# Patient Record
Sex: MALE | Race: OTHER | Employment: FULL TIME | URBAN - METROPOLITAN AREA
[De-identification: names, ages, dates, MRNs, and addresses within clinical notes are randomized per-mention and may not be internally consistent; named-entity substitution may affect disease eponyms.]

---

## 2018-05-09 ENCOUNTER — HOSPITAL ENCOUNTER (OUTPATIENT)
Dept: PHYSICAL THERAPY | Age: 38
Discharge: HOME | End: 2018-05-09
Payer: COMMERCIAL

## 2018-05-09 PROCEDURE — 97530 THERAPEUTIC ACTIVITIES: CPT | Performed by: PHYSICAL THERAPIST

## 2018-05-09 PROCEDURE — 97161 PT EVAL LOW COMPLEX 20 MIN: CPT | Performed by: PHYSICAL THERAPIST

## 2018-05-09 PROCEDURE — 97110 THERAPEUTIC EXERCISES: CPT | Performed by: PHYSICAL THERAPIST

## 2018-05-09 NOTE — PROGRESS NOTES
[facility] in [address] [facility] [place], [address] [place] [address]  Phone: [phone] Fax: [phone] of Care/ Statement of Necessity for Physical Therapy Services    Patient name: Billie Forrester Start of Care: 2018   Referral source: Thomas Arzate MD : 1980    Medical Diagnosis: Lumbar strain [S39.012A]   Onset Date:5/3/2018    Treatment Diagnosis: LBP   Prior Hospitalization: see medical history Provider#: [id number]   Medications: Verified on Patient summary List    Comorbidities: smoker 1/2 PPD    Prior Level of Function: no limitations       The Plan of Care and following information is based on the information from the initial evaluation. Assessment/ key information: pt is a pleasant 36y/o male with low back pain x 5 days intermittent but initial pain occurred went bent forward to pick something up. + intense sharp pain , difficulty WB and walking went to ER. Today pain 0-4/10 aggravated with standing flexion less with pressups and DK to Chest. No nerve tension, no deviation, no rad sx , WNL gross LE strength 5/5 but would benefit from core strength progression , WNL reflexes , WNL sensation. Mild left rotation alignment L4-5. Tight left HS 69 deg , mild tight quads Right > left. Pt started on extension mobility progression and discussed use of lumbar roll in sitting. Pt would benefit from skilled physical therapy for return to PLOF without pain and to gain HEP and education to avoid recurrence in future.    Evaluation Complexity History MEDIUM  Complexity : 1-2 comorbidities / personal factors will impact the outcome/ POC ; Examination HIGH Complexity : 4+ Standardized tests and measures addressing body structure, function, activity limitation and / or participation in recreation  ;Presentation LOW Complexity : Stable, uncomplicated  ;Clinical Decision Making LOW Complexity : FOTO score of   Overall Complexity Rating: LOW   Problem List: pain affecting function, decrease ROM, decrease strength, decrease ADL/ functional abilitiies, decrease activity tolerance and decrease flexibility/ joint mobility   Treatment Plan may include any combination of the following: Therapeutic exercise, Therapeutic activities, Neuromuscular re-education, Physical agent/modality, Manual therapy, Patient education, Self Care training and Functional mobility training  Patient / Family readiness to learn indicated by: asking questions, trying to perform skills and interest  Persons(s) to be included in education: patient (P)  Barriers to Learning/Limitations: None  Patient Goal (s): get better strengthen lower back   Patient Self Reported Health Status: excellent  Rehabilitation Potential: excellent    Short Term Goals: To be accomplished in 2 weeks:  1. Pt will be compliant with HEP for max progression toward all goals and return to PLOF. Eval:started on HEP extension mobility and education on lumbar roll   Current: NA     2.Pt pain will improve >= 40% to allow pt improved sleep and tolerance to daily activity. Eval:since flareup pain max 8/10   Current: NA     3. Pt FOTO score will increase by >=7 points to show improvement in functional mobility. Eval:65/100  Current: will address at visit 5  1. Pt will be independant with HEP for continued and ongoing progression following discharge toward full functional mobility. Eval:started on initial HEP given handout   Current: NA     2.Pt pain will improve >= 80% to allow pt return to PLOF. Eval:pain highest 8/10   Current: NA     3. Pt FOTO score will increase by >=15 points to show improvement in functional mobility. Eval:65/100  Current: will reassess every 5th visit      4. 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 Patient/ Caregiver education and instruction: Diagnosis, prognosis, self care, activity modification and exercises   [x]  Plan of care has been reviewed with REMBERTO Short, PT 5/9/2018 1:59 PM    ________________________________________________________________________    I certify that the above Therapy Services are being furnished while the patient is under my care. I agree with the treatment plan and certify that this therapy is necessary. [de-identified] Signature:____________________  Date:____________Time: _________    Please sign and return to [facility] at [facility].  Yadiel Purvis [address]  Phone: [phone] Fax: [phone]

## 2018-05-09 NOTE — PROGRESS NOTES
PT DAILY TREATMENT NOTE     Patient Name: Almas Daniels  HTCA:[id number]  : 1980  [x]  Patient  Verified  Payor: [address] [place] [address] Road / Plan: Christinancsherrie Olivarez / Product Type: Workers Comp /    In [employer] time:1335  Total Treatment Time (min): 60  Visit #: 1 of 8    Treatment Area: Lumbar strain [S39.012A]    SUBJECTIVE  Pain Level (0-10 scale): 0 at rest in sitting   Any medication changes, allergies to medications, adverse drug reactions, diagnosis change, or new procedure performed?: [x] No    [] Yes (see summary sheet for update)  Subjective functional status/changes:   [] No changes reported  See evaluation for LBP     OBJECTIVE      35 min [x]Eval                  []Re-Eval       12 min Therapeutic Exercise:  [x] See flow sheet :   Rationale: increase ROM and increase proprioception to improve the patients ability to return to full functional mobility without pain     10 min Therapeutic Activity:  []  See flow sheet : education on postural awareness and lumbar mechanics , motions to initially avoid , use of lumbar roll    Rationale: increase proprioception  to improve the patients postural awareness and return to full functional mobility       3 min Manual Therapy:  PA mobs to low lumbar    Rationale: decrease pain, increase ROM and increase tissue extensibility to return to full functional mobility           With   [] TE   [x] TA   [] neuro   [] other: Patient Education: [x] Review HEP    [] Progressed/Changed HEP based on:   [x] positioning   [x] body mechanics   [] transfers   [] heat/ice application    [x] other:use of towel roll       Other Objective/Functional Measures: FOTO : 65/100     Pain Level (0-10 scale) post treatment: 0    ASSESSMENT/Changes in Function: pt is a pleasant 38y/o male with low back pain x 5 days intermittent but initial pain occurred went bent forward to pick something up. + intense sharp pain , difficulty WB and walking went to ER.  Today pain 0-4/10 aggravated with standing flexion less with pressups and DK to Chest. No nerve tension, no deviation, no rad sx , WNL gross LE strength 5/5 , WNL reflexes , WNL sensation. Mild left rotation alignment L4-5. Tight left HS 69 deg , mild tight quads Right > left. Pt started on extension mobility progression and discussed use of lumbar roll in sitting. Pt would benefit from skilled physical therapy for return to PLOF without pain and to gain HEP and education to avoid recurrence in future. Patient will continue to benefit from skilled PT services to modify and progress therapeutic interventions, address functional mobility deficits, address ROM deficits, address strength deficits, analyze and address soft tissue restrictions, analyze and cue movement patterns, analyze and modify body mechanics/ergonomics and assess and modify postural abnormalities to attain remaining goals. [x]  See Plan of Care  []  See progress note/recertification  []  See Discharge Summary         Progress towards goals / Updated goals:  Short Term Goals: STG- To be accomplished in 2 week(s):  1. Pt will be compliant with HEP for max progression toward all goals and return to PLOF. Eval:started on HEP extension mobility and education on lumbar roll   Current: NA    2. Pt pain will improve >= 40% to allow pt improved sleep and tolerance to daily activity. Eval:since flareup pain max 8/10   Current: NA    3. Pt FOTO score will increase by >=7 points to show improvement in functional mobility. Eval:65/100  Current: will address at visit 5      Long Term Goals: LTG- To be accomplished in 4 week(s):    1. Pt will be independant with HEP for continued and ongoing progression following discharge toward full functional mobility. Eval:started on initial HEP given handout   Current: NA    2. Pt pain will improve >= 80% to allow pt return to PLOF. Eval:pain highest 8/10   Current: NA    3.  Pt FOTO score will increase by >=15 points to show improvement in functional mobility. Eval:65/100  Current: will reassess every 5th visit     4. 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

## 2018-05-14 ENCOUNTER — HOSPITAL ENCOUNTER (OUTPATIENT)
Dept: PHYSICAL THERAPY | Age: 38
Discharge: HOME | End: 2018-05-14
Payer: COMMERCIAL

## 2018-05-14 PROCEDURE — 97530 THERAPEUTIC ACTIVITIES: CPT

## 2018-05-14 PROCEDURE — 97110 THERAPEUTIC EXERCISES: CPT

## 2018-05-14 NOTE — PROGRESS NOTES
PT DAILY TREATMENT NOTE     Patient Name: Tiara Wong  STTP:  : 1980  [x]  Patient  Verified  Payor: [address] [place] [address] Road / Plan: Evelin Jolley / Product Type: Workers Comp /    In [employer] time:4:55  Total Treatment Time (min): 50  Visit #: 2 of 3    Treatment Area: Lumbar strain [S39.012A]    SUBJECTIVE  Pain Level (0-10 scale): 0  Any medication changes, allergies to medications, adverse drug reactions, diagnosis change, or new procedure performed?: [x] No    [] Yes (see summary sheet for update)  Subjective functional status/changes:   [] No changes reported  \"No pain unless I bend forward. \"    OBJECTIVE      30 min Therapeutic Exercise:  [x] See flow sheet :   Rationale: increase ROM and increase strength to improve the patients ability to perform daily activities with decreased pain and symptom levels    20 min Therapeutic Activity:  [x]  See flow sheet : pt education on anatomy, body mechanics, supine Oov activities to improve core strength    Rationale: increase ROM, increase strength, improve coordination, improve balance and increase proprioception  to improve the patients ability to perform daily activities with decreased pain and symptom levels           With   [] TE   [] TA   [] neuro   [] other: Patient Education: [x] Review HEP    [] Progressed/Changed HEP based on:   [] positioning   [] body mechanics   [] transfers   [] heat/ice application    [] other:      Other Objective/Functional Measures:   Increased thoracic flexion with box lift    Pain Level (0-10 scale) post treatment: 0    ASSESSMENT/Changes in Function: Tolerated exercises well with no increase in pain. Pt educated on proper sitting posture and body mechanics when lifting to decreased injury risk. Reports no pain unless bends forward however able to  10# box to waist height without pain.      Patient will continue to benefit from skilled PT services to modify and progress therapeutic interventions, address functional mobility deficits, address ROM deficits, address strength deficits, analyze and modify body mechanics/ergonomics, assess and modify postural abnormalities and instruct in home and community integration to attain remaining goals. []  See Plan of Care  []  See progress note/recertification  []  See Discharge Summary         Progress towards goals / Updated goals:  Short Term Goals: To be accomplished in 2 weeks:  1.  Pt will be compliant with HEP for max progression toward all goals and return to PLOF. Eval:started on HEP extension mobility and education on lumbar roll   Current: compliance per pt report      2. Pt pain will improve >= 40% to allow pt improved sleep and tolerance to daily activity. Eval:since flareup pain max 8/10   Current: no pain today      3. Pt FOTO score will increase by >=7 points to show improvement in functional mobility. Eval:65/100  Current: will address at visit 5    Long Term Goals: To be accomplished in 4 weeks:  1.  Pt will be independant with HEP for continued and ongoing progression following discharge toward full functional mobility. Eval:started on initial HEP given handout   Current: NA      2. Pt pain will improve >= 80% to allow pt return to PLOF. Eval:pain highest 8/10   Current: NA      3. Pt FOTO score will increase by >=15 points to show improvement in functional mobility.    Eval:65/100  Current: will reassess every 5th visit       4.  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

## 2018-05-16 ENCOUNTER — HOSPITAL ENCOUNTER (OUTPATIENT)
Dept: PHYSICAL THERAPY | Age: 38
Discharge: HOME | End: 2018-05-16
Payer: COMMERCIAL

## 2018-05-16 PROCEDURE — 97530 THERAPEUTIC ACTIVITIES: CPT

## 2018-05-16 PROCEDURE — 97110 THERAPEUTIC EXERCISES: CPT

## 2018-05-16 NOTE — PROGRESS NOTES
PT DAILY TREATMENT NOTE     Patient Name: Clarissa Agustin  AZMQ:  : 1980  [x]  Patient  Verified  Payor: [address] [place] [address] Road / Plan: [employer] / Product Type: Workers Comp /    In [employer] time:1:50  Total Treatment Time (min): 48  Visit #: 3 of 8    Treatment Area: Lumbar strain [S39.012A]    SUBJECTIVE  Pain Level (0-10 scale): 0  Any medication changes, allergies to medications, adverse drug reactions, diagnosis change, or new procedure performed?: [x] No    [] Yes (see summary sheet for update)  Subjective functional status/changes:   [] No changes reported  \"No pain. I sometimes get pain down my right leg. \"    OBJECTIVE      28 min Therapeutic Exercise:  [x] See flow sheet : re-eval   Rationale: increase ROM and increase strength to improve the patients ability to perform daily activities with decreased pain and symptom levels    20 min Therapeutic Activity:  [x]  See flow sheet :   Rationale: increase ROM, increase strength, improve coordination, improve balance and increase proprioception  to improve the patients ability to perform daily activities with decreased pain and symptom levels           With   [] TE   [] TA   [] neuro   [] other: Patient Education: [x] Review HEP    [] Progressed/Changed HEP based on:   [] positioning   [] body mechanics   [] transfers   [] heat/ice application    [] other:      Other Objective/Functional Measures:   See updated goals below   Bilateral LE reflexes 2+  Negative SLR, slump, quadrant     Pain Level (0-10 scale) post treatment: 0    ASSESSMENT/Changes in Function: Pt presented to therapy with c/c low back pain after bending forward to  something at work. Pt has attended 3 sessions focusing on improving mobility, flexibility, core strengthening and lifting mechanics. Pt reports overall pain has decreased with 2/10 at max now. Describes pain as sharp in center low back then down right side occasionally.  Pt negative for SLR, slump and quadrant tests with [facility] bilateral LE reflexes. Pt able to tolerate box lift and carry up to 50# and sled push/pull at least 100# without back pain. Improved lifting mechanics today with good use of LE and decreased thoracic flexion. Pt is ready to be discharged at this time due to progress towards goals and no pain with advanced core activities and lifting. Updated HEP and discharge instructions given out today. Patient will continue to benefit from skilled PT services to modify and progress therapeutic interventions, address functional mobility deficits, address strength deficits, analyze and modify body mechanics/ergonomics, assess and modify postural abnormalities and instruct in home and community integration to attain remaining goals. []  See Plan of Care  []  See progress note/recertification  []  See Discharge Summary         Progress towards goals / Updated goals:  Short Term Goals: To be accomplished in 2 weeks:  1.  Pt will be compliant with HEP for max progression toward all goals and return to PLOF. Eval:started on HEP extension mobility and education on lumbar roll   Current: compliance per pt report goal MEt      2. Pt pain will improve >= 40% to allow pt improved sleep and tolerance to daily activity. Eval:since flareup pain max 8/10   Current: max pain 2/10 goal Met      3. Pt FOTO score will increase by >=7 points to show improvement in functional mobility. Eval:65/100  Current: NT  - not tested today due to time constraints     Long Term Goals: To be accomplished in 4 weeks:  1.  Pt will be independant with HEP for continued and ongoing progression following discharge toward full functional mobility. Eval:started on initial HEP given handout   Current: compliance per pt report, updated HEP given today goal Met      2. Pt pain will improve >= 80% to allow pt return to PLOF. Eval:pain highest 8/10   Current: 2/10 max pain - almost Met      3.  Pt FOTO score will increase by >=15 points to show improvement in functional mobility. Eval:65/100  Current: NT - not tested due to time constraints        4.  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

## 2018-05-16 NOTE — PROGRESS NOTES
[facility] in [address] [place], [facility] [address]  Phone: [phone]      Fax:  [phone]    Discharge Summary      Patient name: Kristian Valle     Start of Care: 18  Referral source: David English MD    : 1980  Medical/Treatment Diagnosis: Lumbar strain [S39.012A]  Onset Date:5/3/18  Prior Hospitalization: see medical history   Provider#: [id number]  Comorbidities: smoker 1/2 PPD  Prior Level of Function:no limitations  Medications: Verified on Patient Summary List    Visits from Start of Care: 3    Missed Visits: 0  Reporting Period : 18 to 18    Short Term Goals: To be accomplished in 2 weeks:  1.  Pt will be compliant with HEP for max progression toward all goals and return to PLOF. Eval:started on HEP extension mobility and education on lumbar roll   Current: compliance per pt report goal MEt      2. Pt pain will improve >= 40% to allow pt improved sleep and tolerance to daily activity. Eval:since flareup pain max 8/10   Current: max pain 2/10 goal Met      3. Pt FOTO score will increase by >=7 points to show improvement in functional mobility. Eval:65/100  Current: NT  - not tested today due to time constraints      Long Term Goals: To be accomplished in 4 weeks:  1.  Pt will be independant with HEP for continued and ongoing progression following discharge toward full functional mobility. Eval:started on initial HEP given handout   Current: compliance per pt report, updated HEP given today goal Met      2. Pt pain will improve >= 80% to allow pt return to PLOF. Eval:pain highest 8/10   Current: 2/10 max pain - almost Met      3. Pt FOTO score will increase by >=15 points to show improvement in functional mobility.    Eval:65/100  Current: NT - not tested due to time constraints        4.  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 Pt has attended 3 sessions focusing on improving mobility, flexibility, core strengthening and lifting mechanics. Pt reports overall pain has decreased with 2/10 at max now. Describes pain as sharp in center low back then down right side occasionally. Pt negative for SLR, slump and quadrant tests with [facility] bilateral LE reflexes. Pt able to tolerate box lift and carry up to 50# and sled push/pull at least 100# without back pain. Improved lifting mechanics today with good use of LE and decreased thoracic flexion. Pt is ready to be discharged at this time due to progress towards goals and no pain with advanced core activities and lifting.  Updated HEP and discharge instructions given out today.        RECOMMENDATIONS:  [x]Discontinue therapy: [x]Patient has reached or is progressing toward set goals      []Patient is non-compliant or has abdicated      []Due to lack of appreciable progress towards set goals    Frantz Flaherty 5/16/2018 2:45 PM